# Patient Record
(demographics unavailable — no encounter records)

---

## 2025-04-01 NOTE — PHYSICAL EXAM
[de-identified] : General: No acute distress, conversant, well-nourished. Head: Normocephalic, atraumatic Neck: trachea midline, FROM Heart: normotensive and normal rate and rhythm Lungs: No labored breathing Skin: No abrasions, no rashes, no edema Psych: Alert and oriented to person, place and time Extremities: no peripheral edema or digital cyanosis Gait: Normal gait. Can perform tandem gait.   Vascular: warm and well perfused distally, palpable distal pulses MSK: Lumbar spine: No tenderness to palpation.  No step-off, no deformity.  NEURO EXAM: Sensation  Left L2  -  2/2             Left L3  -  2/2 Left L4  -  2/2 Left L5  -  2/2 Left S1  -  2/2  Right L2  -  2/2             Right L3  -  2/2 Right L4  -  2/2 Right L5  -  2/2 Right S1  -  2/2  Motor:  Left L2 (hip flexion)                            5/5                 Left L3 (knee extension)                   5/5                 Left L4 (ankle dorsiflexion)                 5/5                 Left L5 (long toe extensor)                5/5                 Left S1 (ankle plantar flexion)           5/5  Right L2 (hip flexion)                            5/5                 Right L3 (knee extension)                   5/5                 Right L4 (ankle dorsiflexion)                 5/5                 Right L5 (long toe extensor)                5/5                 Right S1 (ankle plantar flexion)           5/5  Reflexes: Normal and symmetric Negative clonus.  Down-going Babinski.   [de-identified] : I ordered radiographs to evaluate the patient's symptoms. Lumbar 4 view radiographs taken in the office today show no dislocation or fracture.  Lumbar spondylosis. Transitional anatomy. L3-L4 spondylolisthesis.

## 2025-04-01 NOTE — HISTORY OF PRESENT ILLNESS
[de-identified] : 29 year old male presents with low back pain and tightness.  He has pain and numbness in his right thigh.  He denies recent illness, fevers, weakness, balance problems, saddle anesthesia, urinary retention or fecal incontinence.  Activity makes the pain worse.

## 2025-04-01 NOTE — ASSESSMENT
[FreeTextEntry1] : 29 year old male presents with low back pain and tightness.  He has pain and numbness in his right thigh.  He denies recent illness, fevers, weakness, balance problems, saddle anesthesia, urinary retention or fecal incontinence.  Activity makes the pain worse. The patient was given a referral for physical therapy.  Given cyclobenzaprine. F/U in 4-6 weeks. We discussed red flag symptoms that would require emergent evaluation. He knows to call with any questions or concerns or if his symptoms acutely worsen.

## 2025-04-01 NOTE — PHYSICAL EXAM
[de-identified] : General: No acute distress, conversant, well-nourished. Head: Normocephalic, atraumatic Neck: trachea midline, FROM Heart: normotensive and normal rate and rhythm Lungs: No labored breathing Skin: No abrasions, no rashes, no edema Psych: Alert and oriented to person, place and time Extremities: no peripheral edema or digital cyanosis Gait: Normal gait. Can perform tandem gait.   Vascular: warm and well perfused distally, palpable distal pulses MSK: Lumbar spine: No tenderness to palpation.  No step-off, no deformity.  NEURO EXAM: Sensation  Left L2  -  2/2             Left L3  -  2/2 Left L4  -  2/2 Left L5  -  2/2 Left S1  -  2/2  Right L2  -  2/2             Right L3  -  2/2 Right L4  -  2/2 Right L5  -  2/2 Right S1  -  2/2  Motor:  Left L2 (hip flexion)                            5/5                 Left L3 (knee extension)                   5/5                 Left L4 (ankle dorsiflexion)                 5/5                 Left L5 (long toe extensor)                5/5                 Left S1 (ankle plantar flexion)           5/5  Right L2 (hip flexion)                            5/5                 Right L3 (knee extension)                   5/5                 Right L4 (ankle dorsiflexion)                 5/5                 Right L5 (long toe extensor)                5/5                 Right S1 (ankle plantar flexion)           5/5  Reflexes: Normal and symmetric Negative clonus.  Down-going Babinski.   [de-identified] : I ordered radiographs to evaluate the patient's symptoms. Lumbar 4 view radiographs taken in the office today show no dislocation or fracture.  Lumbar spondylosis. Transitional anatomy. L3-L4 spondylolisthesis.